# Patient Record
Sex: MALE | Race: WHITE
[De-identification: names, ages, dates, MRNs, and addresses within clinical notes are randomized per-mention and may not be internally consistent; named-entity substitution may affect disease eponyms.]

---

## 2021-05-21 ENCOUNTER — HOSPITAL ENCOUNTER (OUTPATIENT)
Dept: HOSPITAL 47 - RADUSWWP | Age: 31
Discharge: HOME | End: 2021-05-21
Attending: INTERNAL MEDICINE
Payer: COMMERCIAL

## 2021-05-21 ENCOUNTER — HOSPITAL ENCOUNTER (OUTPATIENT)
Dept: HOSPITAL 47 - RADXRMAIN | Age: 31
Discharge: HOME | End: 2021-05-21
Attending: NURSE PRACTITIONER
Payer: COMMERCIAL

## 2021-05-21 DIAGNOSIS — M79.641: ICD-10-CM

## 2021-05-21 DIAGNOSIS — Z87.442: ICD-10-CM

## 2021-05-21 DIAGNOSIS — R10.9: Primary | ICD-10-CM

## 2021-05-21 DIAGNOSIS — M25.531: Primary | ICD-10-CM

## 2021-05-21 PROCEDURE — 76770 US EXAM ABDO BACK WALL COMP: CPT

## 2021-05-21 NOTE — US
EXAMINATION TYPE: US kidneys/renal and bladder

 

DATE OF EXAM: 5/21/2021

 

COMPARISON: NONE

 

CLINICAL HISTORY: Z87.442 PERSONAL HX KIDNEY STONES. Abdomen pain

 

EXAM MEASUREMENTS:

 

Right Kidney:  10.7 x 4.6 x 5.1 cm

Left Kidney: 10.4 x 5.5 x 4.4 cm

 

 

 

Right Kidney: No hydronephrosis or renal calculi seen  

Left Kidney: No hydronephrosis or renal calculi seen  

Bladder: wnl

**Bilateral Jets seen: yes

 

There is no evidence for hydronephrosis at this point in time.  No nephrolithiasis is seen.   The uri
nary bladder is anechoic.  Bilateral ureteral jets are seen.

 

IMPRESSION:

No hydronephrosis or shadowing renal calculi.

## 2021-05-21 NOTE — XR
EXAMINATION TYPE: XR hand complete RT, XR wrist complete RT

 

DATE OF EXAM: 5/21/2021

 

CLINICAL HISTORY: Pain in the right thumb, years of mechanical work

 

TECHNIQUE:  Frontal, lateral and oblique images of the right wrist are obtained.

 

COMPARISON: None

 

FINDINGS: 

 

Right hand: There is no evidence of acute fracture or dislocation of the right hand. The distal radiu
s and ulna, carpals and metacarpals and phalanges are in alignment. Soft tissues are unremarkable.

No radiopaque foreign body.

 

 

Right wrist: There is no evidence of fracture or dislocation of the right wrist. The distal radius an
d ulna are intact. The carpals are in alignment. Proximal metacarpals are in alignment. Soft tissues 
are unremarkable. No radiopaque foreign body.

 

IMPRESSION:

1. No evidence of acute fracture or dislocation of the right hand.

2. No evidence of fracture or dislocation of the right wrist.

## 2025-05-20 ENCOUNTER — HOSPITAL ENCOUNTER (OUTPATIENT)
Dept: HOSPITAL 47 - RADUSWWP | Age: 35
Discharge: HOME | End: 2025-05-20
Attending: INTERNAL MEDICINE
Payer: COMMERCIAL

## 2025-05-20 DIAGNOSIS — R10.9: Primary | ICD-10-CM

## 2025-05-20 PROCEDURE — 76700 US EXAM ABDOM COMPLETE: CPT
